# Patient Record
Sex: FEMALE | Race: WHITE | Employment: OTHER | URBAN - METROPOLITAN AREA
[De-identification: names, ages, dates, MRNs, and addresses within clinical notes are randomized per-mention and may not be internally consistent; named-entity substitution may affect disease eponyms.]

---

## 2023-05-04 ENCOUNTER — APPOINTMENT (OUTPATIENT)
Dept: CT IMAGING | Age: 83
End: 2023-05-04
Payer: COMMERCIAL

## 2023-05-04 ENCOUNTER — HOSPITAL ENCOUNTER (EMERGENCY)
Age: 83
Discharge: HOME | End: 2023-05-04
Attending: STUDENT IN AN ORGANIZED HEALTH CARE EDUCATION/TRAINING PROGRAM
Payer: COMMERCIAL

## 2023-05-04 VITALS
WEIGHT: 140 LBS | DIASTOLIC BLOOD PRESSURE: 61 MMHG | TEMPERATURE: 98.8 F | SYSTOLIC BLOOD PRESSURE: 150 MMHG | HEART RATE: 69 BPM | HEIGHT: 65 IN | RESPIRATION RATE: 16 BRPM | OXYGEN SATURATION: 100 % | BODY MASS INDEX: 23.32 KG/M2

## 2023-05-04 DIAGNOSIS — K62.3 RECTAL PROLAPSE: Primary | ICD-10-CM

## 2023-05-04 DIAGNOSIS — K62.5 RECTAL BLEEDING: ICD-10-CM

## 2023-05-04 LAB
ALBUMIN SERPL-MCNC: 4.2 G/DL (ref 3.4–5)
ALBUMIN/GLOB SERPL: 1.4 {RATIO} (ref 1.1–2.2)
ALP SERPL-CCNC: 95 U/L (ref 40–129)
ALT SERPL-CCNC: 7 U/L (ref 10–40)
ANION GAP SERPL CALCULATED.3IONS-SCNC: 14 MMOL/L (ref 3–16)
APTT BLD: 28.3 SEC (ref 22.7–35.9)
AST SERPL-CCNC: 15 U/L (ref 15–37)
BASOPHILS # BLD: 0.1 K/UL (ref 0–0.2)
BASOPHILS NFR BLD: 0.7 %
BILIRUB SERPL-MCNC: 0.3 MG/DL (ref 0–1)
BUN SERPL-MCNC: 18 MG/DL (ref 7–20)
CALCIUM SERPL-MCNC: 9.7 MG/DL (ref 8.3–10.6)
CHLORIDE SERPL-SCNC: 105 MMOL/L (ref 99–110)
CO2 SERPL-SCNC: 26 MMOL/L (ref 21–32)
CREAT SERPL-MCNC: 0.9 MG/DL (ref 0.6–1.2)
DEPRECATED RDW RBC AUTO: 13.3 % (ref 12.4–15.4)
EOSINOPHIL # BLD: 0.2 K/UL (ref 0–0.6)
EOSINOPHIL NFR BLD: 2.9 %
GFR SERPLBLD CREATININE-BSD FMLA CKD-EPI: >60 ML/MIN/{1.73_M2}
GLUCOSE SERPL-MCNC: 91 MG/DL (ref 70–99)
HCT VFR BLD AUTO: 35.9 % (ref 36–48)
HGB BLD-MCNC: 11.7 G/DL (ref 12–16)
INR PPP: 1 (ref 0.84–1.16)
LACTATE BLDV-SCNC: 0.9 MMOL/L (ref 0.4–1.9)
LYMPHOCYTES # BLD: 1.8 K/UL (ref 1–5.1)
LYMPHOCYTES NFR BLD: 23.3 %
MCH RBC QN AUTO: 28.9 PG (ref 26–34)
MCHC RBC AUTO-ENTMCNC: 32.6 G/DL (ref 31–36)
MCV RBC AUTO: 88.9 FL (ref 80–100)
MONOCYTES # BLD: 0.5 K/UL (ref 0–1.3)
MONOCYTES NFR BLD: 6.1 %
NEUTROPHILS # BLD: 5.2 K/UL (ref 1.7–7.7)
NEUTROPHILS NFR BLD: 67 %
PLATELET # BLD AUTO: 310 K/UL (ref 135–450)
PMV BLD AUTO: 6.7 FL (ref 5–10.5)
POTASSIUM SERPL-SCNC: 4.2 MMOL/L (ref 3.5–5.1)
PROT SERPL-MCNC: 7.1 G/DL (ref 6.4–8.2)
PROTHROMBIN TIME: 13.2 SEC (ref 11.5–14.8)
RBC # BLD AUTO: 4.04 M/UL (ref 4–5.2)
SODIUM SERPL-SCNC: 145 MMOL/L (ref 136–145)
WBC # BLD AUTO: 7.8 K/UL (ref 4–11)

## 2023-05-04 PROCEDURE — 74177 CT ABD & PELVIS W/CONTRAST: CPT

## 2023-05-04 PROCEDURE — 83605 ASSAY OF LACTIC ACID: CPT

## 2023-05-04 PROCEDURE — 80053 COMPREHEN METABOLIC PANEL: CPT

## 2023-05-04 PROCEDURE — 85610 PROTHROMBIN TIME: CPT

## 2023-05-04 PROCEDURE — 85730 THROMBOPLASTIN TIME PARTIAL: CPT

## 2023-05-04 PROCEDURE — 85025 COMPLETE CBC W/AUTO DIFF WBC: CPT

## 2023-05-04 PROCEDURE — 99285 EMERGENCY DEPT VISIT HI MDM: CPT

## 2023-05-04 PROCEDURE — 6360000004 HC RX CONTRAST MEDICATION: Performed by: STUDENT IN AN ORGANIZED HEALTH CARE EDUCATION/TRAINING PROGRAM

## 2023-05-04 RX ORDER — DOCUSATE SODIUM 100 MG/1
100 CAPSULE, LIQUID FILLED ORAL 2 TIMES DAILY
Qty: 60 CAPSULE | Refills: 0 | Status: SHIPPED | OUTPATIENT
Start: 2023-05-04 | End: 2023-06-03

## 2023-05-04 RX ORDER — SENNOSIDES 8.6 MG
1 TABLET ORAL DAILY
Qty: 120 TABLET | Refills: 0 | Status: SHIPPED | OUTPATIENT
Start: 2023-05-04

## 2023-05-04 RX ADMIN — IOPAMIDOL 75 ML: 755 INJECTION, SOLUTION INTRAVENOUS at 17:46

## 2023-05-04 ASSESSMENT — ENCOUNTER SYMPTOMS
SHORTNESS OF BREATH: 0
CHEST TIGHTNESS: 0
ANAL BLEEDING: 1
ABDOMINAL PAIN: 0
NAUSEA: 0
DIARRHEA: 0
VOMITING: 0
BLOOD IN STOOL: 1
COUGH: 0

## 2023-05-04 ASSESSMENT — PAIN - FUNCTIONAL ASSESSMENT: PAIN_FUNCTIONAL_ASSESSMENT: NONE - DENIES PAIN

## 2023-05-04 NOTE — ED PROVIDER NOTES
In addition to the advanced practice provider, I personally saw Jim Patel and performed a substantive portion of the visit including all aspects of the medical decision making. Medical Decision Making    BRBPR of acute onset, at nursing home. This started today. No emesis  No pain to the abd or rectum. No blood thinner use. On exam pt is resting comfortably  Cardiac RRR, no murmur  Lungs clear bilaterally, no increased work of breathing  Abdomen soft nontender  Chaperone present for  exam.  Small area of rectal prolapse noted, easily reducible. SEP-1  Is this patient to be included in the SEP-1 Core Measure due to severe sepsis or septic shock? No   Exclusion criteria - the patient is NOT to be included for SEP-1 Core Measure due to: Infection is not suspected    Screenings     [place] Coma Scale  Eye Opening: Spontaneous  Best Verbal Response: Confused  Best Motor Response: Obeys commands  Randolph Coma Scale Score: 14        CT ABDOMEN PELVIS W IV CONTRAST Additional Contrast? None   Final Result   Rectal prolapse is identified. Negative for bowel obstruction. Negative for   perforation. Labs Reviewed   CBC WITH AUTO DIFFERENTIAL - Abnormal; Notable for the following components:       Result Value    Hemoglobin 11.7 (*)     Hematocrit 35.9 (*)     All other components within normal limits   COMPREHENSIVE METABOLIC PANEL - Abnormal; Notable for the following components:    ALT 7 (*)     All other components within normal limits   APTT   PROTIME-INR   LACTATE, SEPSIS     Medications   iopamidol (ISOVUE-370) 76 % injection 75 mL (75 mLs IntraVENous Given 5/4/23 9115)        Patient presents for bright red blood per rectum, found to have rectal prolapse. I was easily able to reduce rectal prolapse here. There is no discoloration or signs of strangulation to the prolapsed area. CT abdomen pelvis showed area of prolapse, no signs of bowel obstruction or perforation.    I have
exam performed with a chaperone present. Patient has what appears to be a bloody mass and it appears to be prolapsing from the rectum. This does not appear infected. There is no active hemorrhaging. Musculoskeletal:         General: Normal range of motion. Cervical back: Normal range of motion and neck supple. Skin:     General: Skin is warm and dry. Coloration: Skin is not pale. Neurological:      Mental Status: She is alert and oriented to person, place, and time. Psychiatric:         Behavior: Behavior normal.           DIAGNOSTIC RESULTS   LABS:    Labs Reviewed   CBC WITH AUTO DIFFERENTIAL - Abnormal; Notable for the following components:       Result Value    Hemoglobin 11.7 (*)     Hematocrit 35.9 (*)     All other components within normal limits   COMPREHENSIVE METABOLIC PANEL - Abnormal; Notable for the following components:    ALT 7 (*)     All other components within normal limits   APTT   PROTIME-INR   LACTATE, SEPSIS       When ordered only abnormal lab results are displayed. All other labs were within normal range or not returned as of this dictation. EKG: When ordered, EKG's are interpreted by the Emergency Department Physician in the absence of a cardiologist.  Please see their note for interpretation of EKG. RADIOLOGY:   Non-plain film images such as CT, Ultrasound and MRI are read by the radiologist. Plain radiographic images are visualized and preliminarily interpreted by the ED Provider with the below findings:        Interpretation per the Radiologist below, if available at the time of this note:    CT ABDOMEN PELVIS W IV CONTRAST Additional Contrast? None    (Results Pending)         PROCEDURES   Unless otherwise noted below, none     Procedures    CRITICAL CARE TIME (.cctime)       PAST MEDICAL HISTORY      has a past medical history of Asthma and Pneumonia.      Chronic Conditions affecting Care: None    EMERGENCY DEPARTMENT COURSE and DIFFERENTIAL DIAGNOSIS/MDM:

## 2023-08-24 ENCOUNTER — HOSPITAL ENCOUNTER (EMERGENCY)
Age: 83
Discharge: HOME | End: 2023-08-24
Payer: COMMERCIAL

## 2023-08-24 VITALS
TEMPERATURE: 97.8 F | HEIGHT: 65 IN | BODY MASS INDEX: 23.32 KG/M2 | RESPIRATION RATE: 15 BRPM | DIASTOLIC BLOOD PRESSURE: 77 MMHG | SYSTOLIC BLOOD PRESSURE: 162 MMHG | OXYGEN SATURATION: 92 % | WEIGHT: 140 LBS | HEART RATE: 64 BPM

## 2023-08-24 DIAGNOSIS — K62.5 BRBPR (BRIGHT RED BLOOD PER RECTUM): ICD-10-CM

## 2023-08-24 DIAGNOSIS — K62.3 RECTAL PROLAPSE: Primary | ICD-10-CM

## 2023-08-24 LAB
ANION GAP SERPL CALCULATED.3IONS-SCNC: 13 MMOL/L (ref 3–16)
APTT BLD: 28.7 SEC (ref 22.7–35.9)
BASOPHILS # BLD: 0.1 K/UL (ref 0–0.2)
BASOPHILS NFR BLD: 1.1 %
BUN SERPL-MCNC: 18 MG/DL (ref 7–20)
CALCIUM SERPL-MCNC: 9.6 MG/DL (ref 8.3–10.6)
CHLORIDE SERPL-SCNC: 101 MMOL/L (ref 99–110)
CO2 SERPL-SCNC: 24 MMOL/L (ref 21–32)
CREAT SERPL-MCNC: 0.9 MG/DL (ref 0.6–1.2)
DEPRECATED RDW RBC AUTO: 13.2 % (ref 12.4–15.4)
EOSINOPHIL # BLD: 0.2 K/UL (ref 0–0.6)
EOSINOPHIL NFR BLD: 2.6 %
GFR SERPLBLD CREATININE-BSD FMLA CKD-EPI: >60 ML/MIN/{1.73_M2}
GLUCOSE SERPL-MCNC: 104 MG/DL (ref 70–99)
HCT VFR BLD AUTO: 35.6 % (ref 36–48)
HGB BLD-MCNC: 12.1 G/DL (ref 12–16)
INR PPP: 0.97 (ref 0.84–1.16)
LYMPHOCYTES # BLD: 2.6 K/UL (ref 1–5.1)
LYMPHOCYTES NFR BLD: 31.8 %
MCH RBC QN AUTO: 29.9 PG (ref 26–34)
MCHC RBC AUTO-ENTMCNC: 33.9 G/DL (ref 31–36)
MCV RBC AUTO: 88.1 FL (ref 80–100)
MONOCYTES # BLD: 0.6 K/UL (ref 0–1.3)
MONOCYTES NFR BLD: 7.7 %
NEUTROPHILS # BLD: 4.6 K/UL (ref 1.7–7.7)
NEUTROPHILS NFR BLD: 56.8 %
PLATELET # BLD AUTO: 277 K/UL (ref 135–450)
PMV BLD AUTO: 6.8 FL (ref 5–10.5)
POTASSIUM SERPL-SCNC: 3.8 MMOL/L (ref 3.5–5.1)
PROTHROMBIN TIME: 12.9 SEC (ref 11.5–14.8)
RBC # BLD AUTO: 4.04 M/UL (ref 4–5.2)
SODIUM SERPL-SCNC: 138 MMOL/L (ref 136–145)
WBC # BLD AUTO: 8.1 K/UL (ref 4–11)

## 2023-08-24 PROCEDURE — 85610 PROTHROMBIN TIME: CPT

## 2023-08-24 PROCEDURE — 99283 EMERGENCY DEPT VISIT LOW MDM: CPT

## 2023-08-24 PROCEDURE — 36415 COLL VENOUS BLD VENIPUNCTURE: CPT

## 2023-08-24 PROCEDURE — 80048 BASIC METABOLIC PNL TOTAL CA: CPT

## 2023-08-24 PROCEDURE — 85025 COMPLETE CBC W/AUTO DIFF WBC: CPT

## 2023-08-24 PROCEDURE — 85730 THROMBOPLASTIN TIME PARTIAL: CPT

## 2023-08-24 RX ORDER — POLYETHYLENE GLYCOL 3350 17 G/17G
17 POWDER, FOR SOLUTION ORAL DAILY
Qty: 238 G | Refills: 0 | Status: SHIPPED | OUTPATIENT
Start: 2023-08-24 | End: 2023-08-31

## 2023-08-24 ASSESSMENT — ENCOUNTER SYMPTOMS
VOMITING: 0
RHINORRHEA: 0
COUGH: 0
BLOOD IN STOOL: 1
ABDOMINAL PAIN: 0
RECTAL PAIN: 1
DIARRHEA: 0
SHORTNESS OF BREATH: 0
NAUSEA: 0

## 2023-08-24 ASSESSMENT — PAIN DESCRIPTION - LOCATION: LOCATION: RECTUM

## 2023-08-24 ASSESSMENT — PAIN - FUNCTIONAL ASSESSMENT: PAIN_FUNCTIONAL_ASSESSMENT: 0-10

## 2023-08-24 ASSESSMENT — PAIN SCALES - GENERAL: PAINLEVEL_OUTOF10: 2

## 2023-08-24 ASSESSMENT — LIFESTYLE VARIABLES
HOW OFTEN DO YOU HAVE A DRINK CONTAINING ALCOHOL: NEVER
HOW MANY STANDARD DRINKS CONTAINING ALCOHOL DO YOU HAVE ON A TYPICAL DAY: PATIENT DOES NOT DRINK

## 2023-08-25 ENCOUNTER — TELEPHONE (OUTPATIENT)
Dept: SURGERY | Age: 83
End: 2023-08-25

## 2023-08-25 NOTE — ED PROVIDER NOTES
[facility]        Pt Name: Leesa Velazco  MRN: [id number]  [address] [facility] [place] 1940  Date of evaluation: 8/24/2023  Provider: Edwin Benavidez PA-C  PCP: Lisa Rahman MD  Note Started: 8:55 PM EDT 8/24/23      ELLYN. I have evaluated this patient. CHIEF COMPLAINT       Chief Complaint   Patient presents with    Rectal Bleeding     [facility], pt reports bright red rectal bleeding that started this morning, rates pain in rectum 2/10 in triage. HX of Hemorids        HISTORY OF PRESENT ILLNESS: 1 or more Elements     History From: Patient  Limitations to history : None    Leesa Velazco is a 79 y.o. female who presents to the emergency department today for evaluation for bright red blood per rectum. The patient is from [facility]. The patient states that she has noticed bright red blood per rectum, states that this has been ongoing throughout the day. She is complaining of a mild rectal discomfort, she is rating is a 2/10. Patient states that she does have a history of hemorrhoids, and states that she has been straining to have bowel movements. She is not on any blood thinners. She denies feeling dizzy or lightheaded. Patient has no abdominal pain. She has no chest pain or shortness of breath. No fevers. No nausea, vomiting. Patient was seen in the emergency room 3 months ago for similar symptoms, did have a rectal prolapse at that time. Nursing Notes were all reviewed and agreed with or any disagreements were addressed in the HPI. REVIEW OF SYSTEMS :      Review of Systems   Constitutional:  Negative for activity change, appetite change, chills and fever. HENT:  Negative for congestion and rhinorrhea. Respiratory:  Negative for cough and shortness of breath. Cardiovascular:  Negative for chest pain. Gastrointestinal:  Positive for blood in stool and rectal pain.
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
## 2023-08-28 NOTE — TELEPHONE ENCOUNTER
LVM for patient's son  Amrikkaci Rigoberto to call the office to schedule office visit with Dr. Evie Scheuermann for rectal prolapse. Please schedule for 30 minutes.

## 2024-05-17 ENCOUNTER — APPOINTMENT (OUTPATIENT)
Dept: CT IMAGING | Age: 84
End: 2024-05-17
Payer: COMMERCIAL

## 2024-05-17 ENCOUNTER — HOSPITAL ENCOUNTER (EMERGENCY)
Age: 84
Discharge: HOME | End: 2024-05-17
Attending: EMERGENCY MEDICINE
Payer: COMMERCIAL

## 2024-05-17 VITALS
OXYGEN SATURATION: 100 % | SYSTOLIC BLOOD PRESSURE: 141 MMHG | BODY MASS INDEX: 23.3 KG/M2 | RESPIRATION RATE: 18 BRPM | HEART RATE: 54 BPM | DIASTOLIC BLOOD PRESSURE: 55 MMHG | TEMPERATURE: 97.7 F | WEIGHT: 140 LBS

## 2024-05-17 DIAGNOSIS — M54.6 ACUTE BILATERAL THORACIC BACK PAIN: ICD-10-CM

## 2024-05-17 DIAGNOSIS — M25.552 LEFT HIP PAIN: ICD-10-CM
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
## 2024-05-17 DIAGNOSIS — S09.90XA CLOSED HEAD INJURY, INITIAL ENCOUNTER: Primary | ICD-10-CM

## 2024-05-17 DIAGNOSIS — G30.9 ALZHEIMER'S DEMENTIA WITHOUT BEHAVIORAL DISTURBANCE, PSYCHOTIC DISTURBANCE, MOOD DISTURBANCE, OR ANXIETY, UNSPECIFIED DEMENTIA SEVERITY, UNSPECIFIED TIMING OF DEMENTIA ONSET (HCC): ICD-10-CM

## 2024-05-17 DIAGNOSIS — F02.80 ALZHEIMER'S DEMENTIA WITHOUT BEHAVIORAL DISTURBANCE, PSYCHOTIC DISTURBANCE, MOOD DISTURBANCE, OR ANXIETY, UNSPECIFIED DEMENTIA SEVERITY, UNSPECIFIED TIMING OF DEMENTIA ONSET (HCC): ICD-10-CM

## 2024-05-17 PROCEDURE — 73700 CT LOWER EXTREMITY W/O DYE: CPT

## 2024-05-17 PROCEDURE — 72125 CT NECK SPINE W/O DYE: CPT

## 2024-05-17 PROCEDURE — 72131 CT LUMBAR SPINE W/O DYE: CPT

## 2024-05-17 PROCEDURE — 70450 CT HEAD/BRAIN W/O DYE: CPT

## 2024-05-17 PROCEDURE — 72128 CT CHEST SPINE W/O DYE: CPT

## 2024-05-17 PROCEDURE — 99284 EMERGENCY DEPT VISIT MOD MDM: CPT

## 2024-05-17 RX ORDER — LIDOCAINE 50 MG/G
1 PATCH TOPICAL DAILY
Qty: 10 PATCH | Refills: 0 | Status: SHIPPED | OUTPATIENT
Start: 2024-05-17 | End: 2024-05-27

## 2024-05-17 RX ORDER — ACETAMINOPHEN 325 MG/1
650 TABLET ORAL ONCE
Status: DISCONTINUED | OUTPATIENT
Start: 2024-05-17 | End: 2024-05-17 | Stop reason: HOSPADM

## 2024-05-17 ASSESSMENT — LIFESTYLE VARIABLES
HOW OFTEN DO YOU HAVE A DRINK CONTAINING ALCOHOL: PATIENT UNABLE TO ANSWER
HOW MANY STANDARD DRINKS CONTAINING ALCOHOL DO YOU HAVE ON A TYPICAL DAY: PATIENT UNABLE TO ANSWER

## 2024-05-18 NOTE — ED PROVIDER NOTES
commands  [place]olph Coma Scale Score: 14       Is this patient to be included in the SEP-1 Core Measure due to severe sepsis or septic shock?   No   Exclusion criteria - the patient is NOT to be included for SEP-1 Core Measure due to:  Infection is not suspected      MDM and ED Course    Patient afebrile and nontoxic.  She is in no distress.  Alert and protecting her airway.  Neuro exam nonfocal, nothing to suggest CVA/TIA or cord injury..  Patient does have underlying dementia, son present at bedside states she is currently at her cognitive baseline.  CT head without evidence of hemorrhage or mass effect.  CT cervical spine without acute fracture or dislocation.  CT imaging of the thoracic and lumbar spine does show some mild spondylolisthesis, however no acute fracture or dislocation.  No red flags on exam for cauda equina.  Remainder of imaging does not reveal any acute traumatic abnormalities.  Suspect fall is likely mechanical per history provided, no anticipated benefit from serum studies or further workup at this time.  Patient offered Tylenol for pain, however declined stating that she was no longer in any pain.  She was able to ambulate in the emergency department with a walker which she uses at baseline, she denies any pain or difficulty.  My suspicion for underlying occult pelvic or hip fracture is very low.  Patient felt safe for discharge back to her nursing home setting where she will be closely monitored.  Patient and son are agreeable with plan.  Return precautions were discussed.    During the patient's ED course, the patient was given:  Medications - No data to display     Consults:  None        History obtained from: Patient, Family (son), and EMS    Pertinent social determinants of health: Mental disability (congenital or acquired, including dementia if applicable)    Chronic conditions potentially affecting care:  Alzheimer's dementia    Review of other records:  Nursing home or ECF  records    Reassessment:  See MDM for details of medications given and reassessment      I Dr. King am the primary clinician of record.        Final Impression  1. Closed head injury, initial encounter    2. Fall, initial encounter    3. Acute bilateral thoracic back pain    4. Left hip pain    5. Alzheimer's dementia without behavioral disturbance, psychotic disturbance, mood disturbance, or anxiety, unspecified dementia severity, unspecified timing of dementia onset (HCC)        Blood pressure (!) 141/55, pulse 54, temperature 97.7 °F (36.5 °C), resp. rate 18, weight 63.5 kg (140 lb), SpO2 100 %.     Disposition:  DISPOSITION Decision To Discharge 05/17/2024 11:22:41 AM      Patient Referrals:  Netta Fuchs MD  [address] [facility] [id number] [phone]    In 2 days        Discharge Medications:  Discharge Medication List as of 5/17/2024 12:08 PM        START taking these medications    Details   lidocaine (LIDODERM) 5 % Place 1 patch onto the skin daily for 10 days 12 hours on, 12 hours off., Disp-10 patch, R-0Print             Discontinued Medications:  Discharge Medication List as of 5/17/2024 12:08 PM          This chart was generated using the Dragon dictation system. I created this record but it may contain dictation errors given the limitations of this technology.    Dariel King DO (electronically signed)  Attending Emergency Physician       Dariel King DO  05/18/24 5561

## 2024-07-04 ENCOUNTER — APPOINTMENT (OUTPATIENT)
Dept: CT IMAGING | Age: 84
End: 2024-07-04
Payer: COMMERCIAL

## 2024-07-04 ENCOUNTER — HOSPITAL ENCOUNTER (EMERGENCY)
Age: 84
Discharge: HOME | End: 2024-07-04
Attending: EMERGENCY MEDICINE
Payer: COMMERCIAL

## 2024-07-04 VITALS
HEART RATE: 68 BPM | WEIGHT: 140 LBS | SYSTOLIC BLOOD PRESSURE: 143 MMHG | OXYGEN SATURATION: 95 % | BODY MASS INDEX: 23.32 KG/M2 | TEMPERATURE: 97 F | HEIGHT: 65 IN | RESPIRATION RATE: 18 BRPM | DIASTOLIC BLOOD PRESSURE: 61 MMHG

## 2024-07-04 DIAGNOSIS — S00.83XA CONTUSION OF FACE, INITIAL ENCOUNTER: ICD-10-CM

## 2024-07-04 DIAGNOSIS — I62.03 ACUTE ON CHRONIC INTRACRANIAL SUBDURAL HEMATOMA (HCC): Primary | ICD-10-CM

## 2024-07-04 DIAGNOSIS — I62.01 ACUTE ON CHRONIC INTRACRANIAL SUBDURAL HEMATOMA (HCC): Primary | ICD-10-CM

## 2024-07-04 LAB
ANION GAP SERPL CALCULATED.3IONS-SCNC: 13 MMOL/L (ref 3–16)
BACTERIA URNS QL MICRO: ABNORMAL /HPF
BASOPHILS # BLD: 0.1 K/UL (ref 0–0.2)
BASOPHILS NFR BLD: 0.6 %
BILIRUB UR QL STRIP.AUTO: NEGATIVE
BUN SERPL-MCNC: 16 MG/DL (ref 7–20)
CALCIUM SERPL-MCNC: 9.3 MG/DL (ref 8.3–10.6)
CHLORIDE SERPL-SCNC: 100 MMOL/L (ref 99–110)
CLARITY UR: CLEAR
CO2 SERPL-SCNC: 25 MMOL/L (ref 21–32)
COLOR UR: YELLOW
CREAT SERPL-MCNC: 0.7 MG/DL (ref 0.6–1.2)
DEPRECATED RDW RBC AUTO: 13.8 % (ref 12.4–15.4)
EOSINOPHIL # BLD: 0 K/UL (ref 0–0.6)
EOSINOPHIL NFR BLD: 0.4 %
EPI CELLS #/AREA URNS AUTO: 0 /HPF (ref 0–5)
GFR SERPLBLD CREATININE-BSD FMLA CKD-EPI: 85 ML/MIN/{1.73_M2}
GLUCOSE SERPL-MCNC: 103 MG/DL (ref 70–99)
GLUCOSE UR STRIP.AUTO-MCNC: NEGATIVE MG/DL
HCT VFR BLD AUTO: 33.4 % (ref 36–48)
HGB BLD-MCNC: 11.2 G/DL (ref 12–16)
HGB UR QL STRIP.AUTO: ABNORMAL
HYALINE CASTS #/AREA URNS AUTO: 1 /LPF (ref 0–8)
KETONES UR STRIP.AUTO-MCNC: NEGATIVE MG/DL
LEUKOCYTE ESTERASE UR QL STRIP.AUTO: NEGATIVE
LYMPHOCYTES # BLD: 1.7 K/UL (ref 1–5.1)
LYMPHOCYTES NFR BLD: 17.9 %
MCH RBC QN AUTO: 30.1 PG (ref 26–34)
MCHC RBC AUTO-ENTMCNC: 33.6 G/DL (ref 31–36)
MCV RBC AUTO: 89.6 FL (ref 80–100)
MONOCYTES # BLD: 1 K/UL (ref 0–1.3)
MONOCYTES NFR BLD: 10.6 %
NEUTROPHILS # BLD: 6.7 K/UL (ref 1.7–7.7)
NEUTROPHILS NFR BLD: 70.5 %
NITRITE UR QL STRIP.AUTO: NEGATIVE
PH UR STRIP.AUTO: 6 [PH] (ref 5–8)
PLATELET # BLD AUTO: 238 K/UL (ref 135–450)
PMV BLD AUTO: 7.2 FL (ref 5–10.5)
POTASSIUM SERPL-SCNC: 4.1 MMOL/L (ref 3.5–5.1)
PROT UR STRIP.AUTO-MCNC: NEGATIVE MG/DL
RBC # BLD AUTO: 3.73 M/UL (ref 4–5.2)
RBC CLUMPS #/AREA URNS AUTO: 10 /HPF (ref 0–4)
SODIUM SERPL-SCNC: 138 MMOL/L (ref 136–145)
SP GR UR STRIP.AUTO: 1.01 (ref 1–1.03)
UA COMPLETE W REFLEX CULTURE PNL UR: ABNORMAL
UA DIPSTICK W REFLEX MICRO PNL UR: YES
URN SPEC COLLECT METH UR: ABNORMAL
UROBILINOGEN UR STRIP-ACNC: 1 E.U./DL
WBC # BLD AUTO: 9.4 K/UL (ref 4–11)
WBC #/AREA URNS AUTO: 1 /HPF (ref 0–5)

## 2024-07-04 PROCEDURE — 80048 BASIC METABOLIC PNL TOTAL CA: CPT

## 2024-07-04 PROCEDURE — 99284 EMERGENCY DEPT VISIT MOD MDM: CPT

## 2024-07-04 PROCEDURE — 85025 COMPLETE CBC W/AUTO DIFF WBC: CPT

## 2024-07-04 PROCEDURE — 81001 URINALYSIS AUTO W/SCOPE: CPT

## 2024-07-04 PROCEDURE — 70450 CT HEAD/BRAIN W/O DYE: CPT

## 2024-07-04 ASSESSMENT — PAIN - FUNCTIONAL ASSESSMENT: PAIN_FUNCTIONAL_ASSESSMENT: 0-10

## 2024-07-04 ASSESSMENT — PAIN SCALES - GENERAL: PAINLEVEL_OUTOF10: 0

## 2024-07-04 ASSESSMENT — LIFESTYLE VARIABLES
HOW MANY STANDARD DRINKS CONTAINING ALCOHOL DO YOU HAVE ON A TYPICAL DAY: PATIENT UNABLE TO ANSWER
HOW OFTEN DO YOU HAVE A DRINK CONTAINING ALCOHOL: PATIENT UNABLE TO ANSWER

## 2024-07-04 NOTE — ED PROVIDER NOTES
radiographic images are visualized and preliminarily interpreted by the emergency physician with the below findings:      Interpretation per the Radiologist below, if available at the time of this note:    CT Head W/O Contrast   Preliminary Result   1. Acute-subacute right subdural hematoma measuring up to 13 mm in thickness.   Midline shift to the left measures 3 mm.   Critical results were called by Dr. Augustine Ratliff MD to PAYAM MIRZA on   7/4/2024 at 03:56.               ED BEDSIDE ULTRASOUND:   Performed by ED Physician - none    LABS:  Labs Reviewed   URINALYSIS WITH REFLEX TO CULTURE - Abnormal; Notable for the following components:       Result Value    Blood, Urine MODERATE (*)     All other components within normal limits   CBC WITH AUTO DIFFERENTIAL - Abnormal; Notable for the following components:    RBC 3.73 (*)     Hemoglobin 11.2 (*)     Hematocrit 33.4 (*)     All other components within normal limits   BASIC METABOLIC PANEL W/ REFLEX TO MG FOR LOW K - Abnormal; Notable for the following components:    Glucose 103 (*)     All other components within normal limits   MICROSCOPIC URINALYSIS       All other labs were within normal range or not returned as of this dictation.    EMERGENCY DEPARTMENT COURSE and DIFFERENTIAL DIAGNOSIS/MDM:   Vitals:    Vitals:    07/04/24 0400 07/04/24 0431 07/04/24 0500 07/04/24 0530   BP: (!) 129/46 96/82 115/61 (!) 143/61   Pulse: 66   68   Resp:       Temp:       SpO2: 94% 98% 95% 95%   Weight:       Height:           Differential diagnosis includes but is not limited to closed head injury, subdural hematoma, subarachnoid hemorrhage, metabolic abnormalities, dehydration, urinary tract infection    Medical Decision Making  Amount and/or Complexity of Data Reviewed  Labs: ordered.  Radiology: ordered.      This 84-year-old dementia patient presents with some mild facial contusions and has an acute on chronic subdural hematoma.  Patient is DNR CC and labs are  procedures.  There was a high probability of clinically significant/life threatening deterioration in the patient's condition which required my urgent intervention.  I had detailed and lengthy discussions with family members and with patient as well    CONSULTS:  None    PROCEDURES:  Unless otherwise noted below, none     Procedures        FINAL IMPRESSION      1. Acute on chronic intracranial subdural hematoma (HCC)    2. Contusion of face, initial encounter          DISPOSITION/PLAN   DISPOSITION Decision To Discharge 07/04/2024 06:07:45 AM      PATIENT REFERRED TO:  Netta Fuchs MD  [address] [facility] [address] [phone]    Schedule an appointment as soon as possible for a visit in 3 days        DISCHARGE MEDICATIONS:  New Prescriptions    No medications on file     Controlled Substances Monitoring:          No data to display                (Please note that portions of this note were completed with a voice recognition program.  Efforts were made to edit the dictations but occasionally words are mis-transcribed.)    Shoshana Clark MD (electronically signed)  Attending Emergency Physician           Shoshana Clark MD  07/04/24 3153

## 2024-07-15 ENCOUNTER — APPOINTMENT (OUTPATIENT)
Dept: GENERAL RADIOLOGY | Age: 84
End: 2024-07-15
Payer: COMMERCIAL

## 2024-07-15 ENCOUNTER — HOSPITAL ENCOUNTER (EMERGENCY)
Age: 84
Discharge: HOME | End: 2024-07-16
Payer: COMMERCIAL

## 2024-07-15 DIAGNOSIS — S72.002A CLOSED FRACTURE OF NECK OF LEFT FEMUR, INITIAL ENCOUNTER (HCC): Primary | ICD-10-CM

## 2024-07-15 PROCEDURE — 99284 EMERGENCY DEPT VISIT MOD MDM: CPT

## 2024-07-15 PROCEDURE — 73502 X-RAY EXAM HIP UNI 2-3 VIEWS: CPT

## 2024-07-16 ENCOUNTER — APPOINTMENT (OUTPATIENT)
Dept: CT IMAGING | Age: 84
End: 2024-07-16
Payer: COMMERCIAL

## 2024-07-16 VITALS
HEART RATE: 61 BPM | OXYGEN SATURATION: 90 % | RESPIRATION RATE: 22 BRPM | DIASTOLIC BLOOD PRESSURE: 56 MMHG | SYSTOLIC BLOOD PRESSURE: 109 MMHG | TEMPERATURE: 97.1 F

## 2024-07-16 PROCEDURE — 6370000000 HC RX 637 (ALT 250 FOR IP): Performed by: EMERGENCY MEDICINE

## 2024-07-16 PROCEDURE — 73700 CT LOWER EXTREMITY W/O DYE: CPT

## 2024-07-16 PROCEDURE — 70450 CT HEAD/BRAIN W/O DYE: CPT

## 2024-07-16 RX ORDER — ONDANSETRON 4 MG/1
4 TABLET, FILM COATED ORAL EVERY 8 HOURS PRN
Qty: 20 TABLET | Refills: 0 | Status: SHIPPED | OUTPATIENT
Start: 2024-07-16

## 2024-07-16 RX ORDER — OXYCODONE HYDROCHLORIDE AND ACETAMINOPHEN 5; 325 MG/1; MG/1
1 TABLET ORAL ONCE
Status: COMPLETED | OUTPATIENT
Start: 2024-07-16 | End: 2024-07-16

## 2024-07-16 RX ORDER — IPRATROPIUM BROMIDE AND ALBUTEROL SULFATE 2.5; .5 MG/3ML; MG/3ML
1 SOLUTION RESPIRATORY (INHALATION) EVERY 6 HOURS
COMMUNITY

## 2024-07-16 RX ORDER — MEMANTINE HYDROCHLORIDE 5 MG/1
5 TABLET ORAL 2 TIMES DAILY
COMMUNITY

## 2024-07-16 RX ORDER — ERGOCALCIFEROL 1.25 MG/1
50000 CAPSULE ORAL
COMMUNITY

## 2024-07-16 RX ORDER — MIRTAZAPINE 15 MG/1
7.5 TABLET, FILM COATED ORAL NIGHTLY
COMMUNITY

## 2024-07-16 RX ORDER — OXYCODONE HYDROCHLORIDE AND ACETAMINOPHEN 5; 325 MG/1; MG/1
1 TABLET ORAL EVERY 6 HOURS PRN
Qty: 12 TABLET | Refills: 0 | Status: SHIPPED | OUTPATIENT
Start: 2024-07-16 | End: 2024-07-19

## 2024-07-16 RX ORDER — DOCUSATE SODIUM 100 MG/1
100 CAPSULE, LIQUID FILLED ORAL DAILY
COMMUNITY

## 2024-07-16 RX ORDER — ATORVASTATIN CALCIUM 10 MG/1
10 TABLET, FILM COATED ORAL NIGHTLY
COMMUNITY

## 2024-07-16 RX ADMIN — OXYCODONE AND ACETAMINOPHEN 1 TABLET: 5; 325 TABLET ORAL at 07:16

## 2024-07-16 ASSESSMENT — PAIN DESCRIPTION - LOCATION: LOCATION: LEG;HIP

## 2024-07-16 ASSESSMENT — PAIN DESCRIPTION - ORIENTATION: ORIENTATION: LEFT

## 2024-07-16 ASSESSMENT — PAIN DESCRIPTION - DESCRIPTORS: DESCRIPTORS: DISCOMFORT

## 2024-07-16 NOTE — ED NOTES
Report given to [facility].  Chart & prescriptions provided.  VSS & alert at this time.

## 2024-07-16 NOTE — ED TRIAGE NOTES
[facility] brought in patient from [facility] after witnessed fall. Did not lose consciousness per staff. Not blood thinner. Given 50mcg of Fentanyl before arriving to ED. Hospice patient. Daughter is on her way. Patient oriented to self and date of birth.     .ed

## 2024-07-16 NOTE — PROGRESS NOTES
Per patient paperwork from [facility] patient is a DNRCC. A copy of the DNRCC form within paperwork sent from the facility.

## 2024-07-16 NOTE — PROGRESS NOTES
Bed alarm sound. Patient attempting to get out of bed. Patient wanting to use Restroom. Reinforced that patient cannot get up at this time. Attempted to place purewick, place on fracture pan. Patient not agreeable with these options. Repositioned patient with assist from another RN.

## 2024-07-16 NOTE — ED PROVIDER NOTES
[id number]  [phone]    As needed, If symptoms worsen      DISCHARGE MEDICATIONS:  New Prescriptions    ONDANSETRON (ZOFRAN) 4 MG TABLET    Take 1 tablet by mouth every 8 hours as needed for Nausea    OXYCODONE-ACETAMINOPHEN (PERCOCET) 5-325 MG PER TABLET    Take 1 tablet by mouth every 6 hours as needed for Pain for up to 3 days. Intended supply: 3 days. Take lowest dose possible to manage pain Max Daily Amount: 4 tablets       DISCONTINUED MEDICATIONS:  Discontinued Medications    No medications on file              (Please note that portions of this note were completed with a voice recognition program.  Efforts were made to edit the dictations but occasionally words are mis-transcribed.)    Kristin Coppola PA-C (electronically signed)       Kristin Coppola PA-C  07/16/24 2686

## 2025-02-24 NOTE — ED NOTES
Patient visited the ED on 2/21/2025.  Patient's call escalated to post ED text tracker.  Spoke with patient and she is concerned about her lab results and antibiotic needed for UTI.  Patient stated informed from ED someone would reach out to discuss ordering antibiotic once labs returned but has not spoken to anyone.  Patient has an existing PCP appointment on 3/3/2025 with Gerardo Dobbs MD.  Patient offered available appointment on today at 2:00 pm with Navjot Cali NP but unable to make appointment due to at work.     Patient offered consult with [facility] provider and she agreed. Pending sale to [facility] provider, Dr. Martino, consulted and prescription ordered and sent to [facility]'s pharmacy on [address] in [place], [place] per patient's request.  Patient agreeable to prescription and appreciative.  No other concerns noted.  Encounter closed at this time.      Pt resting at present. Bed alarm on. Pt to be discharged. Awaiting EMS this morning to transfer back to [facility].